# Patient Record
(demographics unavailable — no encounter records)

---

## 2025-03-27 NOTE — PHYSICAL EXAM
[Chaperone Present] : A chaperone was present in the examining room during all aspects of the physical examination [FreeTextEntry2] : Lor Cristobal [Appropriately responsive] : appropriately responsive [Alert] : alert [No Acute Distress] : no acute distress [No Lymphadenopathy] : no lymphadenopathy [Regular Rate Rhythm] : regular rate rhythm [No Murmurs] : no murmurs [Clear to Auscultation B/L] : clear to auscultation bilaterally [Soft] : soft [Non-tender] : non-tender [Non-distended] : non-distended [No HSM] : No HSM [No Lesions] : no lesions [No Mass] : no mass [Oriented x3] : oriented x3 [Examination Of The Breasts] : a normal appearance [No Masses] : no breast masses were palpable [Labia Majora] : normal [Labia Minora] : normal [IUD String] : an IUD string was noted [Normal] : normal [Uterine Adnexae] : normal

## 2025-03-27 NOTE — HISTORY OF PRESENT ILLNESS
[PapSmeardate] : 2024 [FreeTextEntry1] : iud [No] : Patient does not have concerns regarding sex [Currently Active] : currently active [Men] : men

## 2025-07-10 NOTE — HISTORY OF PRESENT ILLNESS
[Home] : at home, [unfilled] , at the time of the visit. [Medical Office: ([facility])___] : at the medical office located in  [Telehealth (audio & video)] : This visit was provided via telehealth using real-time 2-way audio visual technology. [Verbal consent obtained from patient] : the patient, [unfilled] [Cold Symptoms] : cold symptoms [Moderate] : moderate [___ Days ago] : [unfilled] days ago [Sudden] : suddenly [Congestion] : congestion [Cough] : cough [Sore Throat] : sore throat [Wheezing] : no wheezing [Chills] : chills [Anorexia] : anorexia [Shortness Of Breath] : no shortness of breath [Earache] : no earache [Fatigue] : fatigue [Headache] : headache [Fever] : no fever [Rest] : rest [NSAIDs] : NSAIDs [Stable] : stable [FreeTextEntry8] : Patient contracted Covid on way back from her honeymoon in [facility].  Went to [facility] and was prescribed Paxlovid, but is too expensive to buy., would like some symptomatic relief